# Patient Record
Sex: FEMALE | Race: WHITE | NOT HISPANIC OR LATINO | URBAN - METROPOLITAN AREA
[De-identification: names, ages, dates, MRNs, and addresses within clinical notes are randomized per-mention and may not be internally consistent; named-entity substitution may affect disease eponyms.]

---

## 2021-09-03 ENCOUNTER — OFFICE VISIT (OUTPATIENT)
Dept: URBAN - METROPOLITAN AREA CLINIC 92 | Facility: CLINIC | Age: 56
End: 2021-09-03

## 2022-03-29 ENCOUNTER — OFFICE VISIT (OUTPATIENT)
Dept: URBAN - METROPOLITAN AREA CLINIC 94 | Facility: CLINIC | Age: 57
End: 2022-03-29
Payer: COMMERCIAL

## 2022-03-29 VITALS
DIASTOLIC BLOOD PRESSURE: 65 MMHG | TEMPERATURE: 97.5 F | WEIGHT: 141 LBS | SYSTOLIC BLOOD PRESSURE: 121 MMHG | BODY MASS INDEX: 24.98 KG/M2 | HEART RATE: 78 BPM | HEIGHT: 63 IN

## 2022-03-29 DIAGNOSIS — R11.2 NAUSEA AND VOMITING, UNSPECIFIED VOMITING TYPE: ICD-10-CM

## 2022-03-29 DIAGNOSIS — K76.89 HEPATIC CYST: ICD-10-CM

## 2022-03-29 DIAGNOSIS — K86.2 PANCREATIC CYST: ICD-10-CM

## 2022-03-29 DIAGNOSIS — R10.10 UPPER ABDOMINAL PAIN: ICD-10-CM

## 2022-03-29 PROCEDURE — 99214 OFFICE O/P EST MOD 30 MIN: CPT | Performed by: INTERNAL MEDICINE

## 2022-03-29 RX ORDER — OMEPRAZOLE 40 MG/1
1 CAPSULE 30 MINUTES BEFORE MORNING MEAL CAPSULE, DELAYED RELEASE ORAL ONCE A DAY
Qty: 60 | Refills: 2 | OUTPATIENT
Start: 2022-03-29

## 2022-03-29 RX ORDER — LISINOPRIL 20 MG/1
1 TABLET TABLET ORAL ONCE A DAY
Status: ACTIVE | COMMUNITY

## 2022-03-29 NOTE — HPI-TODAY'S VISIT:
56 y/o F with hx of hepatic cyst, macrolipasemia here for abdominal pain, n/v.   Patient previously followed by Dr Kaur, last seen in 2019 for similar symptoms of abdominal pain, n/v. During workup, noted to have mildly elevated lipases('s), amyplase(100-300's), however undetectable urine lipase consistent with macrolipasemia. Normal ionized calcium, PTH and triglycerides(92). Her ultrasound 01/2019 with hepatic cysts. F/u MRI/MRCP 08/2019 with 2 hepatic cysts(largest 4.7cm), 0.6cm pancreatic cyst in body/tail consistent with IPMN. Additional workup included EGD 04/2019 with chronic inactive gastritis, chronic duodenitis without H.pylori. Colonoscopy 12/2015 normal. Was on prilosec 40mg daily  Patient reports was doing well until a few months ago when had return of symptoms. Has once/week episode of vomiting after meals. Some upper abdominal pain. No longer on PPI Denies alcohol or marijuana use

## 2022-04-12 ENCOUNTER — TELEPHONE ENCOUNTER (OUTPATIENT)
Dept: URBAN - METROPOLITAN AREA CLINIC 40 | Facility: CLINIC | Age: 57
End: 2022-04-12

## 2022-04-29 ENCOUNTER — TELEPHONE ENCOUNTER (OUTPATIENT)
Dept: URBAN - METROPOLITAN AREA CLINIC 94 | Facility: CLINIC | Age: 57
End: 2022-04-29

## 2022-05-24 ENCOUNTER — OFFICE VISIT (OUTPATIENT)
Dept: URBAN - METROPOLITAN AREA CLINIC 94 | Facility: CLINIC | Age: 57
End: 2022-05-24
Payer: COMMERCIAL

## 2022-05-24 ENCOUNTER — DASHBOARD ENCOUNTERS (OUTPATIENT)
Age: 57
End: 2022-05-24

## 2022-05-24 VITALS
HEIGHT: 63 IN | BODY MASS INDEX: 24.8 KG/M2 | HEART RATE: 78 BPM | TEMPERATURE: 97.7 F | SYSTOLIC BLOOD PRESSURE: 134 MMHG | DIASTOLIC BLOOD PRESSURE: 73 MMHG | WEIGHT: 140 LBS

## 2022-05-24 DIAGNOSIS — R10.10 UPPER ABDOMINAL PAIN: ICD-10-CM

## 2022-05-24 DIAGNOSIS — K76.89 HEPATIC CYST: ICD-10-CM

## 2022-05-24 DIAGNOSIS — R11.2 NAUSEA AND VOMITING, UNSPECIFIED VOMITING TYPE: ICD-10-CM

## 2022-05-24 DIAGNOSIS — D49.0 IPMN (INTRADUCTAL PAPILLARY MUCINOUS NEOPLASM): ICD-10-CM

## 2022-05-24 PROBLEM — 85057007: Status: ACTIVE | Noted: 2022-03-29

## 2022-05-24 PROCEDURE — 99214 OFFICE O/P EST MOD 30 MIN: CPT | Performed by: INTERNAL MEDICINE

## 2022-05-24 RX ORDER — LISINOPRIL 20 MG/1
1 TABLET TABLET ORAL ONCE A DAY
Status: ACTIVE | COMMUNITY

## 2022-05-24 RX ORDER — SUCRALFATE 1 G
1 TABLET ON AN EMPTY STOMACH. CAN DISSOLVE IN WATER TABLET ORAL TWICE A DAY
Qty: 60 TABLET | Refills: 2 | OUTPATIENT
Start: 2022-05-24 | End: 2022-08-22

## 2022-05-24 RX ORDER — OMEPRAZOLE 40 MG/1
1 CAPSULE 30 MINUTES BEFORE MORNING MEAL CAPSULE, DELAYED RELEASE ORAL ONCE A DAY
Qty: 60 | Refills: 2 | Status: ACTIVE | COMMUNITY
Start: 2022-03-29

## 2022-05-24 NOTE — HPI-TODAY'S VISIT:
56 y/o F with hx of hepatic cyst, macrolipasemia here for abdominal pain, n/v.   Patient previously followed by Dr Kaur, last seen in 2019 for similar symptoms of RUQ abdominal pain, n/v. During workup, noted to have mildly elevated lipases('s), amyplase(100-300's), however undetectable urine lipase consistent with macrolipasemia. Normal ionized calcium, PTH and triglycerides(92). Her ultrasound 01/2019 with hepatic cysts. F/u MRI/MRCP 08/2019 with 2 hepatic cysts(largest 4.7cm), 0.6cm pancreatic cyst in body/tail consistent with IPMN. Additional workup included EGD 04/2019 with chronic inactive gastritis, chronic duodenitis without H.pylori. Colonoscopy 12/2015 normal. Had return of symptoms last visit , with MRI a/p  04/2022 with 2 hepatic cysts(largest 6.1x4.7cm), and stable 0.6cm IPMN.  Patient today still have intermittent RUQ pain, n/v that occurs 1x/week. No benefit with omeprazole.  Also occasional loose stools for years   MRI 04/2022: Multifocal hepatic cysts, with largest 6.1x4.7cm, 0.6cm IPMN

## 2022-08-23 ENCOUNTER — OFFICE VISIT (OUTPATIENT)
Dept: URBAN - METROPOLITAN AREA CLINIC 94 | Facility: CLINIC | Age: 57
End: 2022-08-23

## 2022-08-24 ENCOUNTER — ERX REFILL RESPONSE (OUTPATIENT)
Dept: URBAN - METROPOLITAN AREA CLINIC 94 | Facility: CLINIC | Age: 57
End: 2022-08-24

## 2022-08-24 RX ORDER — SUCRALFATE 1 G/1
TAKE 1 TABLET BY MOUTH TWICE DAILY ON AN EMPTY STOMACH MAY DISSOLVE IN WATER TABLET ORAL
Qty: 60 TABLET | Refills: 0 | OUTPATIENT

## 2022-08-24 RX ORDER — SUCRALFATE 1 G/1
1 TABLET ON AN EMPTY STOMACH, MAY DISSOLVE IN WATER TABLET ORAL
Qty: 60 | Refills: 2 | OUTPATIENT

## 2025-04-23 ENCOUNTER — OFFICE VISIT (OUTPATIENT)
Dept: URBAN - METROPOLITAN AREA CLINIC 94 | Facility: CLINIC | Age: 60
End: 2025-04-23
Payer: COMMERCIAL

## 2025-04-23 ENCOUNTER — LAB OUTSIDE AN ENCOUNTER (OUTPATIENT)
Dept: URBAN - METROPOLITAN AREA CLINIC 94 | Facility: CLINIC | Age: 60
End: 2025-04-23

## 2025-04-23 DIAGNOSIS — D49.0 IPMN (INTRADUCTAL PAPILLARY MUCINOUS NEOPLASM): ICD-10-CM

## 2025-04-23 DIAGNOSIS — K76.89 HEPATIC CYST: ICD-10-CM

## 2025-04-23 DIAGNOSIS — R11.2 NAUSEA AND VOMITING, UNSPECIFIED VOMITING TYPE: ICD-10-CM

## 2025-04-23 DIAGNOSIS — R10.10 UPPER ABDOMINAL PAIN: ICD-10-CM

## 2025-04-23 PROCEDURE — 99214 OFFICE O/P EST MOD 30 MIN: CPT | Performed by: INTERNAL MEDICINE

## 2025-04-23 RX ORDER — LISINOPRIL 20 MG/1
1 TABLET TABLET ORAL ONCE A DAY
Status: ACTIVE | COMMUNITY

## 2025-04-23 NOTE — PHYSICAL EXAM SKIN:
Pt called regarding labs  Pap Is not resulted yet   Pt only wants to speak with you no rashes , no jaundice present , good turgor , no masses , no tenderness on palpation

## 2025-04-23 NOTE — HPI-TODAY'S VISIT:
61 y/o F with hx of hepatic cyst, IPMN, macrolipasemia here for f/u abdominal pain, n/v.   Patient previously followed by Dr Kaur, last seen in 2019 for similar symptoms of RUQ abdominal pain, n/v. During workup, noted to have mildly elevated lipases('s), amyplase(100-300's), however undetectable urine lipase consistent with macrolipasemia. Normal ionized calcium, PTH and triglycerides(92). Her ultrasound 01/2019 with hepatic cysts. F/u MRI/MRCP 08/2019 with 2 hepatic cysts(largest 4.7cm), 0.6cm pancreatic cyst in body/tail consistent with IPMN. Additional workup included EGD 04/2019 with chronic inactive gastritis, chronic duodenitis without H.pylori. Colonoscopy 12/2015 normal. Had return of symptoms, with MRI a/p  04/2022 with 2 hepatic cysts(largest 6.1x4.7cm), and stable 0.6cm IPMN. US RUQ and HIDA 06/2022 reassuring.   Today reports doing well. Has intermittent RUQ pain, n/v that occurs 2x/month. Mild benefit with omeprazole in past, though symptoms too infrequent too tell so she stopped it   HIDA 06/2022: Normal including EF US RUQ 06/2022: simple hepatic cyst 3.9cm, no gallstones MRI 04/2022: Multifocal hepatic cysts, with largest 6.1x4.7cm, 0.6cm IPMN EGD 04/2019: chronic inactive gastritis, chronic duodenitis without H.pylori. Colonoscopy 12/2015: Small IH otherwise normal. Repeat in 10 years

## 2025-05-20 ENCOUNTER — TELEPHONE ENCOUNTER (OUTPATIENT)
Dept: URBAN - METROPOLITAN AREA CLINIC 94 | Facility: CLINIC | Age: 60
End: 2025-05-20

## 2025-05-20 ENCOUNTER — LAB OUTSIDE AN ENCOUNTER (OUTPATIENT)
Dept: URBAN - METROPOLITAN AREA CLINIC 94 | Facility: CLINIC | Age: 60
End: 2025-05-20

## 2025-06-12 ENCOUNTER — TELEPHONE ENCOUNTER (OUTPATIENT)
Dept: URBAN - METROPOLITAN AREA CLINIC 23 | Facility: CLINIC | Age: 60
End: 2025-06-12

## 2025-06-12 ENCOUNTER — OFFICE VISIT (OUTPATIENT)
Dept: URBAN - METROPOLITAN AREA MEDICAL CENTER 28 | Facility: MEDICAL CENTER | Age: 60
End: 2025-06-12
Payer: COMMERCIAL

## 2025-06-12 DIAGNOSIS — K21.00 REFLUX ESOPHAGITIS: ICD-10-CM

## 2025-06-12 DIAGNOSIS — K86.2 PANCREATIC CYST: ICD-10-CM

## 2025-06-12 DIAGNOSIS — R10.13 DYSPEPSIA: ICD-10-CM

## 2025-06-12 DIAGNOSIS — K86.89 OTHER SPECIFIED DISEASES OF PANCREAS: ICD-10-CM

## 2025-06-12 PROCEDURE — 43242 EGD US FINE NEEDLE BX/ASPIR: CPT | Performed by: INTERNAL MEDICINE

## 2025-06-12 PROCEDURE — 43239 EGD BIOPSY SINGLE/MULTIPLE: CPT | Performed by: INTERNAL MEDICINE

## 2025-06-12 RX ORDER — LISINOPRIL 20 MG/1
1 TABLET TABLET ORAL ONCE A DAY
Status: ACTIVE | COMMUNITY

## 2025-06-26 ENCOUNTER — WEB ENCOUNTER (OUTPATIENT)
Dept: URBAN - METROPOLITAN AREA CLINIC 111 | Facility: CLINIC | Age: 60
End: 2025-06-26